# Patient Record
Sex: FEMALE | Race: WHITE | NOT HISPANIC OR LATINO | URBAN - METROPOLITAN AREA
[De-identification: names, ages, dates, MRNs, and addresses within clinical notes are randomized per-mention and may not be internally consistent; named-entity substitution may affect disease eponyms.]

---

## 2017-05-11 ENCOUNTER — OFFICE VISIT (OUTPATIENT)
Dept: INTERNAL MEDICINE | Facility: CLINIC | Age: 22
End: 2017-05-11
Payer: COMMERCIAL

## 2017-05-11 ENCOUNTER — LAB VISIT (OUTPATIENT)
Dept: LAB | Facility: HOSPITAL | Age: 22
End: 2017-05-11
Attending: FAMILY MEDICINE
Payer: COMMERCIAL

## 2017-05-11 VITALS
SYSTOLIC BLOOD PRESSURE: 120 MMHG | TEMPERATURE: 98 F | DIASTOLIC BLOOD PRESSURE: 64 MMHG | HEIGHT: 66 IN | BODY MASS INDEX: 18.63 KG/M2 | WEIGHT: 115.94 LBS

## 2017-05-11 DIAGNOSIS — Z02.0 ENCOUNTER FOR SCHOOL HISTORY AND PHYSICAL EXAMINATION: Primary | ICD-10-CM

## 2017-05-11 DIAGNOSIS — Z02.0 ENCOUNTER FOR SCHOOL HISTORY AND PHYSICAL EXAMINATION: ICD-10-CM

## 2017-05-11 DIAGNOSIS — Z23 NEED FOR HEPATITIS B VACCINATION: ICD-10-CM

## 2017-05-11 PROCEDURE — 86382 NEUTRALIZATION TEST VIRAL: CPT

## 2017-05-11 PROCEDURE — 90471 IMMUNIZATION ADMIN: CPT | Mod: S$GLB,,, | Performed by: FAMILY MEDICINE

## 2017-05-11 PROCEDURE — 86787 VARICELLA-ZOSTER ANTIBODY: CPT

## 2017-05-11 PROCEDURE — 99395 PREV VISIT EST AGE 18-39: CPT | Mod: 25,S$GLB,, | Performed by: FAMILY MEDICINE

## 2017-05-11 PROCEDURE — 90746 HEPB VACCINE 3 DOSE ADULT IM: CPT | Mod: S$GLB,,, | Performed by: FAMILY MEDICINE

## 2017-05-11 PROCEDURE — 99999 PR PBB SHADOW E&M-NEW PATIENT-LVL III: CPT | Mod: PBBFAC,,, | Performed by: FAMILY MEDICINE

## 2017-05-11 PROCEDURE — 36415 COLL VENOUS BLD VENIPUNCTURE: CPT | Mod: PO

## 2017-05-11 NOTE — MR AVS SNAPSHOT
[facility]  [address] [place] [address]  Phone: [phone]  Fax: [phone]                  Sue Turner   2017 11:40 AM   Office Visit    Description:  Female : 1995   Provider:  Ssuan Magaña MD   Department:  [facility]           Reason for Visit     Establish Care     Immunizations           Diagnoses this Visit        Comments    Encounter for school history and physical examination    -  Primary     Need for hepatitis B vaccination                To Do List           Future Appointments        Provider Department Dept Phone    2017 1:00 PM LAB, SAME DAY [facility] [phone]    5/15/2017 8:30 AM NURSE SCHEDULE, SUSAN MAGAÑA [facility] [phone]      Goals (5 Years of Data)     None      [facility]     [facility] -  Assistance  Unless otherwise directed by your provider, please contact [facility], our nurse care line that is available for  assistance.     Registered nurses in the [facility] provide: appointment scheduling, clinical advisement, health education, and other advisory services.  Call: [phone] (toll free)               Medications           Message regarding Medications     Verify the changes and/or additions to your medication regime listed below are the same as discussed with your clinician today.  If any of these changes or additions are incorrect, please notify your healthcare provider.             Verify that the below list of medications is an accurate representation of the medications you are currently taking.  If none reported, the list may be blank. If incorrect, please contact your healthcare provider. Carry this list with you in case of emergency.           Current Medications     levonorgestrel (JEREL) 14 mcg/24 hour (3 years) IUD 1 each by Intrauterine route once.           Clinical Reference Information  "          Your Vitals Were     BP Temp Height    120/64 (BP Location: Right arm, Patient Position: Sitting, BP Method: Manual) 98.3 °F (36.8 °C) (Tympanic) 5' 6" (1.676 m)    Weight Last Period BMI    52.6 kg (115 lb 15.4 oz) 05/04/2017 (Approximate) 18.72 kg/m2      Blood Pressure          Most Recent Value    BP  120/64      Allergies as of 5/11/2017     No Known Allergies      Immunizations Administered on Date of Encounter - 5/11/2017     Name Date Dose VIS Date Route    Hepatitis B, Adult  Incomplete 1 mL 7/20/2016 Intramuscular      Orders Placed During Today's Visit      Normal Orders This Visit    Hepatitis B Vaccine (Adult) (IM)     Future Labs/Procedures Expected by Expires    Poliovirus antibodies, types 1, 2, and 3  5/11/2017 7/10/2018    Varicella zoster antibody, IgG  5/11/2017 7/10/2018    POCT TB Skin Test Read  5/15/2017 7/10/2018    Hepatitis B Vaccine (Adult) (IM)  6/10/2017 5/11/2018    Hepatitis B Vaccine (Adult) (IM)  11/7/2017 5/11/2018      [facility] Sign-Up     Activating your [facility] account is as easy as 1-2-3!     1) Visit [email or web address], select Sign Up Now, enter this activation code and your date of birth, then select Next.  [email or web address]  Expires: 6/23/2017  1:47 PM      2) Create a username and password to use when you visit [facility] in the future and select a security question in case you lose your password and select Next.    3) Enter your e-mail address and click Sign Up!    Additional Information  If you have questions, please e-mail [email or web address] or call [phone] to talk to our [facility] staff. Remember, [facility] is NOT to be used for urgent needs. For medical emergencies, dial 911.         Language Assistance Services     ATTENTION: Language assistance services are available, free of charge. Please call [phone].      ATENCIÓN: Si habla español, tiene a fair disposición servicios gratuitos de asistencia lingüística. Llame al [phone].     CHÚ Ý: N?u " b?n nói Ti?ng Vi?t, có các d?ch v? h? tr? ngôn ng? mi?n phí dành cho b?n. G?i s? [phone].         [facility] complies with applicable Federal civil rights laws and does not discriminate on the basis of race, color, national origin, age, disability, or sex.

## 2017-05-11 NOTE — PROGRESS NOTES
"Subjective:   Patient ID: Sue Turner is a 22 y.o. female.  Chief Complaint:  Establish Care and Immunizations    HPI Comments: Presents for physical for nursing school.  No significant past medical history.  IUD for contraception.  No daily medications.  No significant no surgical history.  No significant family history.   Has proof of 2 MMR vaccines and Tdap within 10 years  No specific complaints or concerns today.  Reviewed [employer] [place] [place] school requirements and patient needs proof of hepatitis B, MMR, parasellar, diphtheria, tetanus, and polio vaccines or community.  Needs PPD within a 12 month period.      Review of Systems   Constitutional: Negative for chills, fatigue and fever.   HENT: Negative for congestion, dental problem, ear pain, postnasal drip, sinus pressure and sore throat.    Eyes: Negative for visual disturbance.   Respiratory: Negative for cough, chest tightness, shortness of breath and wheezing.    Cardiovascular: Negative for chest pain, palpitations and leg swelling.   Gastrointestinal: Negative for abdominal pain, blood in stool, constipation, diarrhea, nausea and vomiting.   Endocrine: Negative for polydipsia, polyphagia and polyuria.   Genitourinary: Negative for difficulty urinating, dysuria, flank pain, hematuria and pelvic pain.   Musculoskeletal: Negative for myalgias.   Skin: Negative for rash.   Neurological: Negative for dizziness, syncope, weakness, light-headedness and headaches.   Hematological: Negative for adenopathy.   Psychiatric/Behavioral: Negative.        Current Outpatient Prescriptions:     levonorgestrel (JEREL) 14 mcg/24 hour (3 years) IUD, 1 each by Intrauterine route once., Disp: , Rfl:     Objective:   /64 (BP Location: Right arm, Patient Position: Sitting, BP Method: Manual)  Temp 98.3 °F (36.8 °C) (Tympanic)   Ht 5' 6" (1.676 m)  Wt 52.6 kg (115 lb 15.4 oz)  LMP 05/04/2017 (Approximate)  BMI 18.72 kg/m2    Physical Exam "   Constitutional: She is oriented to person, place, and time. Vital signs are normal. She appears well-developed and well-nourished.   HENT:   Right Ear: Hearing, tympanic membrane, external ear and ear canal normal.   Left Ear: Hearing, tympanic membrane, external ear and ear canal normal.   Nose: Nose normal. Right sinus exhibits no maxillary sinus tenderness and no frontal sinus tenderness. Left sinus exhibits no maxillary sinus tenderness and no frontal sinus tenderness.   Mouth/Throat: Uvula is midline, oropharynx is clear and moist and mucous membranes are normal.   Eyes: Conjunctivae, EOM and lids are normal. Pupils are equal, round, and reactive to light.   Neck: No JVD present. No thyroid mass and no thyromegaly present.   Cardiovascular: Normal rate, regular rhythm and normal heart sounds.  Exam reveals no gallop and no friction rub.    No murmur heard.  Pulses:       Radial pulses are 2+ on the right side, and 2+ on the left side.   Pulmonary/Chest: Effort normal and breath sounds normal. She has no wheezes. She has no rhonchi. She has no rales.   Abdominal: Soft. Bowel sounds are normal. She exhibits no distension. There is no tenderness. There is no rebound, no guarding and no CVA tenderness.   Musculoskeletal: She exhibits no edema.   Neurological: She is oriented to person, place, and time. She displays a negative Romberg sign. Coordination and gait normal.   Skin: Skin is warm, dry and intact. No rash noted.   Psychiatric: She has a normal mood and affect. Her behavior is normal. Judgment and thought content normal.   Nursing note and vitals reviewed.    Assessment:     1. Encounter for school history and physical examination    2. Need for hepatitis B vaccination      Plan:   Encounter for school history and physical examination  -     Varicella zoster antibody, IgG; Future; Expected date: 5/11/17  -     Poliovirus antibodies, types 1, 2, and 3; Future; Expected date: 5/11/17  -     POCT TB Skin  Test Read; Future; Expected date: 5/15/17  Return on Monday for PPD placement to be read on Wednesday or Thursday.  Provide physical form for completion at that nurse visit.  Has proof of MMR and tetanus vaccination.  Try and confirm parasellar and polio immunity by titers.  ReStart and complete full hep B series.  Previously only received 1 dose.  -     Hepatitis B Vaccine (Adult) (IM)  -     Hepatitis B Vaccine (Adult) (IM); Future; Expected date: 6/10/17  -     Hepatitis B Vaccine (Adult) (IM); Future; Expected date: 11/7/17    Otherwise return to clinic 1 year for annual exam or sooner as needed.

## 2017-05-15 ENCOUNTER — CLINICAL SUPPORT (OUTPATIENT)
Dept: INTERNAL MEDICINE | Facility: CLINIC | Age: 22
End: 2017-05-15
Payer: COMMERCIAL

## 2017-05-15 DIAGNOSIS — Z02.0 ENCOUNTER FOR SCHOOL HISTORY AND PHYSICAL EXAMINATION: ICD-10-CM

## 2017-05-15 LAB
VARICELLA INTERPRETATION: POSITIVE
VARICELLA ZOSTER IGG: 1.83 ISR

## 2017-05-15 PROCEDURE — 86580 TB INTRADERMAL TEST: CPT | Mod: S$GLB,,, | Performed by: FAMILY MEDICINE

## 2017-05-17 ENCOUNTER — CLINICAL SUPPORT (OUTPATIENT)
Dept: INTERNAL MEDICINE | Facility: CLINIC | Age: 22
End: 2017-05-17
Payer: COMMERCIAL

## 2017-05-17 DIAGNOSIS — Z11.1 ENCOUNTER FOR PPD SKIN TEST READING: Primary | ICD-10-CM

## 2017-05-19 LAB
POLIO 1: NORMAL
POLIO 3: NORMAL

## 2017-05-23 ENCOUNTER — TELEPHONE (OUTPATIENT)
Dept: INTERNAL MEDICINE | Facility: CLINIC | Age: 22
End: 2017-05-23

## 2017-05-23 NOTE — TELEPHONE ENCOUNTER
----- Message from Joseph Larose MD sent at 5/22/2017  1:18 PM CDT -----  Polio Immune. + Titers. Copy to patient.

## 2017-06-14 ENCOUNTER — CLINICAL SUPPORT (OUTPATIENT)
Dept: INTERNAL MEDICINE | Facility: CLINIC | Age: 22
End: 2017-06-14
Payer: COMMERCIAL

## 2017-06-14 DIAGNOSIS — Z23 NEED FOR HEPATITIS B VACCINATION: ICD-10-CM

## 2017-06-14 PROCEDURE — 90746 HEPB VACCINE 3 DOSE ADULT IM: CPT | Mod: S$GLB,,, | Performed by: FAMILY MEDICINE

## 2017-06-14 PROCEDURE — 90471 IMMUNIZATION ADMIN: CPT | Mod: S$GLB,,, | Performed by: FAMILY MEDICINE

## 2017-06-14 NOTE — PROGRESS NOTES
Pt received Hepatitis B #2 vaccine. Pt tolerated well. Nurse visit for Hep B #3 not scheduled because [place].

## 2018-12-21 ENCOUNTER — PATIENT OUTREACH (OUTPATIENT)
Dept: ADMINISTRATIVE | Facility: HOSPITAL | Age: 23
End: 2018-12-21

## 2018-12-21 NOTE — PROGRESS NOTES
I have attempted without success to contact this patient by phone to schedule pap. Patient not available, left voicemail.

## 2020-04-01 ENCOUNTER — TELEPHONE (OUTPATIENT)
Dept: INTERNAL MEDICINE | Facility: CLINIC | Age: 25
End: 2020-04-01

## 2020-04-01 NOTE — TELEPHONE ENCOUNTER
LVM, I was calling to to seeing if the patient have a PCP, because she haven't been seen since 2017..